# Patient Record
Sex: FEMALE | Race: ASIAN | NOT HISPANIC OR LATINO
[De-identification: names, ages, dates, MRNs, and addresses within clinical notes are randomized per-mention and may not be internally consistent; named-entity substitution may affect disease eponyms.]

---

## 2020-10-26 PROBLEM — Z00.00 ENCOUNTER FOR PREVENTIVE HEALTH EXAMINATION: Status: ACTIVE | Noted: 2020-10-26

## 2020-10-28 ENCOUNTER — APPOINTMENT (OUTPATIENT)
Dept: CARDIOLOGY | Facility: CLINIC | Age: 75
End: 2020-10-28
Payer: MEDICARE

## 2020-10-28 ENCOUNTER — NON-APPOINTMENT (OUTPATIENT)
Age: 75
End: 2020-10-28

## 2020-10-28 VITALS
TEMPERATURE: 98 F | HEART RATE: 63 BPM | DIASTOLIC BLOOD PRESSURE: 83 MMHG | OXYGEN SATURATION: 98 % | SYSTOLIC BLOOD PRESSURE: 157 MMHG | BODY MASS INDEX: 19.84 KG/M2 | HEIGHT: 63 IN | RESPIRATION RATE: 18 BRPM | WEIGHT: 112 LBS

## 2020-10-28 DIAGNOSIS — Z82.49 FAMILY HISTORY OF ISCHEMIC HEART DISEASE AND OTHER DISEASES OF THE CIRCULATORY SYSTEM: ICD-10-CM

## 2020-10-28 DIAGNOSIS — E11.9 TYPE 2 DIABETES MELLITUS W/OUT COMPLICATIONS: ICD-10-CM

## 2020-10-28 PROCEDURE — 93306 TTE W/DOPPLER COMPLETE: CPT

## 2020-10-28 PROCEDURE — 99072 ADDL SUPL MATRL&STAF TM PHE: CPT

## 2020-10-28 PROCEDURE — ZZZZZ: CPT

## 2020-10-28 PROCEDURE — 99204 OFFICE O/P NEW MOD 45 MIN: CPT | Mod: 25

## 2020-10-28 PROCEDURE — 93015 CV STRESS TEST SUPVJ I&R: CPT

## 2020-10-28 PROCEDURE — 93000 ELECTROCARDIOGRAM COMPLETE: CPT | Mod: 59

## 2020-10-29 PROBLEM — Z82.49 FAMILY HISTORY OF MYOCARDIAL INFARCTION: Status: ACTIVE | Noted: 2020-10-29

## 2020-11-03 ENCOUNTER — NON-APPOINTMENT (OUTPATIENT)
Age: 75
End: 2020-11-03

## 2020-11-03 DIAGNOSIS — R93.89 ABNORMAL FINDINGS ON DIAGNOSTIC IMAGING OF OTHER SPECIFIED BODY STRUCTURES: ICD-10-CM

## 2020-11-11 ENCOUNTER — NON-APPOINTMENT (OUTPATIENT)
Age: 75
End: 2020-11-11

## 2020-11-12 ENCOUNTER — APPOINTMENT (OUTPATIENT)
Dept: THORACIC SURGERY | Facility: CLINIC | Age: 75
End: 2020-11-12
Payer: MEDICARE

## 2020-11-12 VITALS
DIASTOLIC BLOOD PRESSURE: 77 MMHG | SYSTOLIC BLOOD PRESSURE: 139 MMHG | OXYGEN SATURATION: 99 % | BODY MASS INDEX: 19.51 KG/M2 | HEART RATE: 61 BPM | WEIGHT: 106 LBS | HEIGHT: 62 IN

## 2020-11-12 DIAGNOSIS — Z87.898 PERSONAL HISTORY OF OTHER SPECIFIED CONDITIONS: ICD-10-CM

## 2020-11-12 PROCEDURE — 99205 OFFICE O/P NEW HI 60 MIN: CPT

## 2020-11-12 PROCEDURE — 99072 ADDL SUPL MATRL&STAF TM PHE: CPT

## 2020-11-13 PROBLEM — Z87.898 HISTORY OF BRADYCARDIA: Status: RESOLVED | Noted: 2020-11-13 | Resolved: 2020-11-13

## 2020-11-13 PROBLEM — E11.9 TYPE 2 DIABETES MELLITUS: Status: ACTIVE | Noted: 2020-11-13

## 2020-11-13 RX ORDER — CHLORHEXIDINE GLUCONATE 4 %
1000 LIQUID (ML) TOPICAL
Refills: 0 | Status: ACTIVE | COMMUNITY

## 2020-11-13 RX ORDER — CHOLECALCIFEROL (VITAMIN D3) 1250 MCG
1.25 MG CAPSULE ORAL
Refills: 0 | Status: ACTIVE | COMMUNITY

## 2020-11-13 RX ORDER — SITAGLIPTIN AND METFORMIN HYDROCHLORIDE 50; 500 MG/1; MG/1
50-500 TABLET, FILM COATED ORAL
Refills: 0 | Status: ACTIVE | COMMUNITY

## 2020-11-13 NOTE — PHYSICAL EXAM
[General Appearance - Well Developed] : well developed [Normal Appearance] : normal appearance [Well Groomed] : well groomed [General Appearance - Well Nourished] : well nourished [No Deformities] : no deformities [General Appearance - In No Acute Distress] : no acute distress [Normal Conjunctiva] : the conjunctiva exhibited no abnormalities [Eyelids - No Xanthelasma] : the eyelids demonstrated no xanthelasmas [Normal Oral Mucosa] : normal oral mucosa [No Oral Pallor] : no oral pallor [No Oral Cyanosis] : no oral cyanosis [Normal Jugular Venous A Waves Present] : normal jugular venous A waves present [Normal Jugular Venous V Waves Present] : normal jugular venous V waves present [No Jugular Venous Hitchcock A Waves] : no jugular venous hitchcock A waves [Heart Rate And Rhythm] : heart rate and rhythm were normal [Heart Sounds] : normal S1 and S2 [Respiration, Rhythm And Depth] : normal respiratory rhythm and effort [Exaggerated Use Of Accessory Muscles For Inspiration] : no accessory muscle use [Auscultation Breath Sounds / Voice Sounds] : lungs were clear to auscultation bilaterally [Abdomen Soft] : soft [Abdomen Tenderness] : non-tender [Abdomen Mass (___ Cm)] : no abdominal mass palpated [Abnormal Walk] : normal gait [Gait - Sufficient For Exercise Testing] : the gait was sufficient for exercise testing [Nail Clubbing] : no clubbing of the fingernails [Cyanosis, Localized] : no localized cyanosis [Petechial Hemorrhages (___cm)] : no petechial hemorrhages [] : no ischemic changes [Oriented To Time, Place, And Person] : oriented to person, place, and time [Affect] : the affect was normal [Mood] : the mood was normal [No Anxiety] : not feeling anxious [FreeTextEntry1] : 2/6 BONNIE diffusely; Trace LE edema noted.

## 2020-11-13 NOTE — HISTORY OF PRESENT ILLNESS
[FreeTextEntry1] : 75 year-old female with HTN, DM with renal cyst and h/o proteinuria presents for evaluation of SOB. Patient reports that for the past 2 weeks she feels SOB with exertion and at rest.  Patient reports h/o bradycardia. Patient had CXR earlier this year.  Patient denies CP.  Patient denies palpitations.  Patient denies h/o syncope.  Patient reports hand numbness that went away after taking vitamin B12.  On examination, patient has 2/6 BONNIE diffusely and trace edema.  I advised patient to undergo an echocardiogram and a treadmill stress test.

## 2020-11-13 NOTE — DISCUSSION/SUMMARY
[FreeTextEntry1] : 75 year-old female with HTN, DM with renal cyst and h/o proteinuria presents for evaluation of SOB. Patient reports that for the past 2 weeks she feels SOB with exertion and at rest.  Patient reports h/o bradycardia. Patient had CXR earlier this year.  Patient denies CP.  Patient denies palpitations.  Patient denies h/o syncope.  Patient reports hand numbness that went away after taking vitamin B12.  On examination, patient has 2/6 BONNIE diffusely and trace edema.  I advised patient to undergo an echocardiogram and a treadmill stress test. \par \par (1) Shortness of breath - Patient underwent an echocardiogram and it showed normal LV function without significant valvular pathology.  Patient underwent a treadmill stress test and completed 4.5 minutes of Kenneth protocol.  There was no ECG evidence of ischemia.  Following treadmill stress, there was no echocardiographic evidence of ischemia.  Patient underwent a CXR and it showed possible right lung mass, which was confirmed on chest CT.  I advised patient to see thoracic surgeon Dr. Matt Richard.  Patient is cleared for surgery and anesthesia if surgery is needed.\par  \par (2) Followup - as needed.

## 2020-11-16 ENCOUNTER — OUTPATIENT (OUTPATIENT)
Dept: OUTPATIENT SERVICES | Facility: HOSPITAL | Age: 75
LOS: 1 days | End: 2020-11-16

## 2020-11-16 ENCOUNTER — APPOINTMENT (OUTPATIENT)
Dept: DISASTER EMERGENCY | Facility: CLINIC | Age: 75
End: 2020-11-16

## 2020-11-16 VITALS
DIASTOLIC BLOOD PRESSURE: 70 MMHG | RESPIRATION RATE: 16 BRPM | HEIGHT: 62 IN | HEART RATE: 61 BPM | TEMPERATURE: 98 F | WEIGHT: 110.01 LBS | OXYGEN SATURATION: 98 % | SYSTOLIC BLOOD PRESSURE: 130 MMHG

## 2020-11-16 DIAGNOSIS — Z01.818 ENCOUNTER FOR OTHER PREPROCEDURAL EXAMINATION: ICD-10-CM

## 2020-11-16 DIAGNOSIS — R59.1 GENERALIZED ENLARGED LYMPH NODES: ICD-10-CM

## 2020-11-16 DIAGNOSIS — E11.9 TYPE 2 DIABETES MELLITUS WITHOUT COMPLICATIONS: ICD-10-CM

## 2020-11-16 DIAGNOSIS — I10 ESSENTIAL (PRIMARY) HYPERTENSION: ICD-10-CM

## 2020-11-16 LAB — HBA1C BLD-MCNC: 5.4 % — SIGNIFICANT CHANGE UP (ref 4–5.6)

## 2020-11-16 RX ORDER — SODIUM CHLORIDE 9 MG/ML
1000 INJECTION, SOLUTION INTRAVENOUS
Refills: 0 | Status: DISCONTINUED | OUTPATIENT
Start: 2020-11-18 | End: 2020-12-02

## 2020-11-16 NOTE — H&P PST ADULT - HISTORY OF PRESENT ILLNESS
74 yo female with preop dx. generalized enlarged lymph nodes presents to pre surgical testing.  Pt with c/o VAZ x 1 month, cardiac workup negative, CXR revealing right lung mass, sp CT Chest revealing lymphadenopathy.  Pt is scheduled for flexible bronchoscopy, endobronchial ultrasound biopsy with cytology.

## 2020-11-16 NOTE — DATA REVIEWED
[FreeTextEntry1] : CT Chest w/ contrast on 11/10/2020:\par - 3.3 x 3 x 4.6cm Rt infrahilar mass, inseparable from the mediastinum, w/ multiple adjacent nodular linear opacities extending to the pleura\par - adjacent consolidation w/ air bronchograms\par - nodular thickening noted along the Rt major and minor fissures\par - a 1 x 0.5cm partially calcified RLL nodule (3:71)\par - focal bronchiectasis w/ mucous plugging and scarring w/in the lingula and bibasilar linear scarring\par - enlarged 1.2cm Rt peribronchial LN (2:45)\par - additional subcentimeter mediastinal LNs and hilar LNs

## 2020-11-16 NOTE — H&P PST ADULT - NEGATIVE NEUROLOGICAL SYMPTOMS
no weakness/no generalized seizures/no paresthesias/no headache/no vertigo/no transient paralysis/no loss of sensation/no difficulty walking

## 2020-11-16 NOTE — H&P PST ADULT - MUSCULOSKELETAL
details… detailed exam no calf tenderness/no joint erythema/no joint warmth/ROM intact/normal strength/no joint swelling

## 2020-11-16 NOTE — H&P PST ADULT - NEGATIVE ENMT SYMPTOMS
no tinnitus/no hearing difficulty/no nasal congestion/no dysphagia/no ear pain/no vertigo/no sinus symptoms

## 2020-11-16 NOTE — HISTORY OF PRESENT ILLNESS
[FreeTextEntry1] : Ms. SHAZIA CHAPMAN, 75 year old female, never smoker, w/ hx of HTN, HLD, DM2, bradycardia, who presented to cardiologist Dr. Jennifer Moser c/o SOB on exertion. Cardiac work-up all negative, then sent for CXR.\par \par CT Chest w/ contrast on 11/10/2020:\par - 3.3 x 3 x 4.6cm Rt infrahilar mass, inseparable from the mediastinum, w/ multiple adjacent nodular linear opacities extending to the pleura\par - adjacent consolidation w/ air bronchograms\par - nodular thickening noted along the Rt major and minor fissures\par - a 1 x 0.5cm partially calcified RLL nodule (3:71)\par - focal bronchiectasis w/ mucous plugging and scarring w/in the lingula and bibasilar linear scarring\par - enlarged 1.2cm Rt peribronchial LN (2:45)\par - additional subcentimeter mediastinal LNs and hilar LNs\par \par Patient is here today for CT Sx consultation, referred by Dr. Moser. Admits to SOB on exertion.\par

## 2020-11-16 NOTE — H&P PST ADULT - NSICDXPASTMEDICALHX_GEN_ALL_CORE_FT
PAST MEDICAL HISTORY:  DM2 (diabetes mellitus, type 2)     Generalized enlarged lymph nodes     Hypertension

## 2020-11-16 NOTE — H&P PST ADULT - NSICDXPROBLEM_GEN_ALL_CORE_FT
PROBLEM DIAGNOSES  Problem: Generalized enlarged lymph nodes  Assessment and Plan:  Pt is scheduled for flexible bronchoscopy, endobronchial ultrasound biopsy with cytology on 11/18/20.  Verbal and written pre op instructions reviewed with patient and pt able to verbalize understanding.  method. COVID testing scheduled preop per pt.   Pt to obtain medical eval as requested by surgeon, document in chart.  ECHO, stress and EKG in chart.      Problem: HTN (hypertension)  Assessment and Plan: Pt instructed to take nifedipine and losartan AM of surgery with a sip of water, pt able to verbalize understanding.     Problem: DM (diabetes mellitus)  Assessment and Plan: OR booking notified of pt's DM status via fax.   Pt instructed to hold Janumet 11/17 PM and 11/18, pt able to verbalize understanding.

## 2020-11-16 NOTE — CONSULT LETTER
[Consult Letter:] : I had the pleasure of evaluating your patient, [unfilled]. [( Thank you for referring [unfilled] for consultation for _____ )] : Thank you for referring [unfilled] for consultation for [unfilled] [Please see my note below.] : Please see my note below. [Consult Closing:] : Thank you very much for allowing me to participate in the care of this patient.  If you have any questions, please do not hesitate to contact me. [Sincerely,] : Sincerely, [DrPraneeth  ___] : Dr. PURI [FreeTextEntry2] : Jennifer Moser MD (Cardiologist/Referring)\par Dr. Maria Luisa Harkins (PCP) [FreeTextEntry3] : Matt Richard MD, MPH \par System Director of Thoracic Surgery \par Director of Comprehensive Lung and Foregut [place] \par Professor Cardiovascular & Thoracic Surgery  \par [facility] [employer] [facility]\par \par [facility]\par [address]\par Oncology [address]\[place] [address]\par Tel: [phone]\par Fax: [phone]\par

## 2020-11-16 NOTE — ASSESSMENT
[FreeTextEntry1] : Ms. SHAZIA CHAPMAN, 75 year old female, never smoker, w/ hx of HTN, HLD, DM2, bradycardia, who presented to cardiologist Dr. Jennifer Moser c/o SOB on exertion. Cardiac work-up all negative, then sent for CXR.\par \par CT Chest w/ contrast on 11/10/2020:\par - 3.3 x 3 x 4.6cm Rt infrahilar mass, inseparable from the mediastinum, w/ multiple adjacent nodular linear opacities extending to the pleura\par - adjacent consolidation w/ air bronchograms\par - nodular thickening noted along the Rt major and minor fissures\par - a 1 x 0.5cm partially calcified RLL nodule (3:71)\par - focal bronchiectasis w/ mucous plugging and scarring w/in the lingula and bibasilar linear scarring\par - enlarged 1.2cm Rt peribronchial LN (2:45)\par - additional subcentimeter mediastinal LNs and hilar LNs\par \par I have reviewed the patient's medical records and diagnostic images at time of this office consultation and have made the following recommendation:\par 1. CT scan reviewed, I recommended a Flex Bronch EBUS Bx on 11/18/2020. Risks and benefits and alternatives explained to patient, all questions answered, patient agreed to proceed with procedure.\par 2. PENDING PFTs and PET/CT scan\par 3. Medical clearance, PST, and COVID testing prior to bronch\par \par \par I personally performed the services described in the documentation, reviewed the documentation recorded by the scribe in my presence and it accurately and completely records my words and actions.\par \par I, Katey Marcial, ALEX, am scribing for and the presence of TIARA Flores, the following sections HISTORY OF PRESENT ILLNESS, PAST MEDICAL/FAMILY/SOCIAL HISTORY; REVIEW OF SYSTEMS; VITAL SIGNS; PHYSICAL EXAM; DISPOSITION.\par \par

## 2020-11-18 ENCOUNTER — RESULT REVIEW (OUTPATIENT)
Age: 75
End: 2020-11-18

## 2020-11-18 ENCOUNTER — APPOINTMENT (OUTPATIENT)
Dept: THORACIC SURGERY | Facility: HOSPITAL | Age: 75
End: 2020-11-18

## 2020-11-18 ENCOUNTER — OUTPATIENT (OUTPATIENT)
Dept: OUTPATIENT SERVICES | Facility: HOSPITAL | Age: 75
LOS: 1 days | Discharge: HOME | End: 2020-11-18
Payer: MEDICARE

## 2020-11-18 VITALS
WEIGHT: 110.01 LBS | HEIGHT: 62 IN | TEMPERATURE: 98 F | DIASTOLIC BLOOD PRESSURE: 65 MMHG | SYSTOLIC BLOOD PRESSURE: 142 MMHG | OXYGEN SATURATION: 100 % | RESPIRATION RATE: 14 BRPM | HEART RATE: 61 BPM

## 2020-11-18 VITALS — RESPIRATION RATE: 16 BRPM | OXYGEN SATURATION: 98 % | HEART RATE: 79 BPM

## 2020-11-18 DIAGNOSIS — R59.1 GENERALIZED ENLARGED LYMPH NODES: ICD-10-CM

## 2020-11-18 LAB — SARS-COV-2 N GENE NPH QL NAA+PROBE: NOT DETECTED

## 2020-11-18 PROCEDURE — 88305 TISSUE EXAM BY PATHOLOGIST: CPT | Mod: 26

## 2020-11-18 PROCEDURE — 31629 BRONCHOSCOPY/NEEDLE BX EACH: CPT

## 2020-11-18 PROCEDURE — 31624 DX BRONCHOSCOPE/LAVAGE: CPT

## 2020-11-18 PROCEDURE — 31625 BRONCHOSCOPY W/BIOPSY(S): CPT | Mod: 59

## 2020-11-18 PROCEDURE — 88112 CYTOPATH CELL ENHANCE TECH: CPT | Mod: 26,59

## 2020-11-18 PROCEDURE — 31623 DX BRONCHOSCOPE/BRUSH: CPT

## 2020-11-18 PROCEDURE — 88173 CYTOPATH EVAL FNA REPORT: CPT | Mod: 26

## 2020-11-18 PROCEDURE — 31652 BRONCH EBUS SAMPLNG 1/2 NODE: CPT

## 2020-11-18 PROCEDURE — 71045 X-RAY EXAM CHEST 1 VIEW: CPT | Mod: 26

## 2020-11-18 PROCEDURE — 31633 BRONCHOSCOPY/NEEDLE BX ADDL: CPT

## 2020-11-18 PROCEDURE — 31645 BRNCHSC W/THER ASPIR 1ST: CPT

## 2020-11-18 RX ORDER — PREGABALIN 225 MG/1
1 CAPSULE ORAL
Qty: 0 | Refills: 0 | DISCHARGE

## 2020-11-18 RX ORDER — SITAGLIPTIN AND METFORMIN HYDROCHLORIDE 500; 50 MG/1; MG/1
1 TABLET, FILM COATED ORAL
Qty: 0 | Refills: 0 | DISCHARGE

## 2020-11-18 RX ORDER — CALCIUM POLYCARBOPHIL 625 MG/1
2 TABLET, FILM COATED ORAL
Qty: 0 | Refills: 0 | DISCHARGE

## 2020-11-18 RX ORDER — NIFEDIPINE 30 MG
1 TABLET, EXTENDED RELEASE 24 HR ORAL
Qty: 0 | Refills: 0 | DISCHARGE

## 2020-11-18 RX ORDER — LOSARTAN POTASSIUM 100 MG/1
1 TABLET, FILM COATED ORAL
Qty: 0 | Refills: 0 | DISCHARGE

## 2020-11-18 NOTE — ASU DISCHARGE PLAN (ADULT/PEDIATRIC) - NURSING INSTRUCTIONS
Cool and warm liquids that are not irritating to the throat should be given initially. Avoid hot liquids and  Avoid citrus juices and milk if they are irritating to your throat.  Advance at your own pace starting with soft foods and advancing to a regular diet. Avoid rough and scratchy foods and foods that are difficult to chew until you are comfortable.

## 2020-11-18 NOTE — ASU DISCHARGE PLAN (ADULT/PEDIATRIC) - CARE PROVIDER_API CALL
Matt Richard  SURGERY  [address] [place] [address] [place] [address]  Phone: [phone]  Fax: [phone]  Follow Up Time:

## 2020-11-18 NOTE — BRIEF OPERATIVE NOTE - NSICDXBRIEFPROCEDURE_GEN_ALL_CORE_FT
PROCEDURES:  Bronchoscopy, with BAL 18-Nov-2020 18:30:53 endobronchial biopsy and endobronchial ultrasound Lidya Beltran

## 2020-11-18 NOTE — ASU DISCHARGE PLAN (ADULT/PEDIATRIC) - CALL YOUR DOCTOR IF YOU HAVE ANY OF THE FOLLOWING:
shortness of breath, it is normal to cough up small amounts of blood tinged sputum/Bleeding that does not stop/Fever greater than (need to indicate Fahrenheit or Celsius) Bleeding that does not stop/Nausea and vomiting that does not stop/Fever greater than (need to indicate Fahrenheit or Celsius)/shortness of breath, it is normal to cough up small amounts of blood tinged sputum/Inability to tolerate liquids or foods/Pain not relieved by Medications/Unable to urinate

## 2020-11-20 ENCOUNTER — TRANSCRIPTION ENCOUNTER (OUTPATIENT)
Age: 75
End: 2020-11-20

## 2020-11-20 LAB
NON-GYNECOLOGICAL CYTOLOGY STUDY: SIGNIFICANT CHANGE UP

## 2020-11-23 LAB — TM INTERPRETATION: SIGNIFICANT CHANGE UP

## 2020-11-24 LAB — GLUCOSE BLDC GLUCOMTR-MCNC: 102 MG/DL — HIGH (ref 70–99)

## 2020-11-27 PROBLEM — R59.1 GENERALIZED ENLARGED LYMPH NODES: Chronic | Status: ACTIVE | Noted: 2020-11-16

## 2020-11-27 PROBLEM — I10 ESSENTIAL (PRIMARY) HYPERTENSION: Chronic | Status: ACTIVE | Noted: 2020-11-16

## 2020-11-27 PROBLEM — E11.9 TYPE 2 DIABETES MELLITUS WITHOUT COMPLICATIONS: Chronic | Status: ACTIVE | Noted: 2020-11-16

## 2020-12-03 ENCOUNTER — APPOINTMENT (OUTPATIENT)
Dept: THORACIC SURGERY | Facility: CLINIC | Age: 75
End: 2020-12-03
Payer: MEDICARE

## 2020-12-03 VITALS
HEIGHT: 62.99 IN | RESPIRATION RATE: 17 BRPM | DIASTOLIC BLOOD PRESSURE: 81 MMHG | SYSTOLIC BLOOD PRESSURE: 148 MMHG | HEART RATE: 63 BPM | WEIGHT: 106 LBS | BODY MASS INDEX: 18.78 KG/M2 | OXYGEN SATURATION: 99 %

## 2020-12-03 DIAGNOSIS — R91.1 SOLITARY PULMONARY NODULE: ICD-10-CM

## 2020-12-03 PROCEDURE — 99072 ADDL SUPL MATRL&STAF TM PHE: CPT

## 2020-12-03 PROCEDURE — 99215 OFFICE O/P EST HI 40 MIN: CPT

## 2020-12-04 PROBLEM — R91.1 LUNG NODULE: Status: ACTIVE | Noted: 2020-12-04

## 2020-12-04 RX ORDER — ICOSAPENT ETHYL 1000 MG/1
1 CAPSULE ORAL
Qty: 360 | Refills: 0 | Status: COMPLETED | COMMUNITY
Start: 2020-09-27

## 2020-12-04 RX ORDER — DENOSUMAB 60 MG/ML
60 INJECTION SUBCUTANEOUS
Qty: 1 | Refills: 0 | Status: COMPLETED | COMMUNITY
Start: 2020-09-01

## 2020-12-04 RX ORDER — HYDROCORTISONE 1 %
12 CREAM (GRAM) TOPICAL
Qty: 400 | Refills: 0 | Status: COMPLETED | COMMUNITY
Start: 2020-09-27

## 2020-12-04 RX ORDER — NIFEDIPINE 90 MG/1
90 TABLET, EXTENDED RELEASE ORAL
Qty: 90 | Refills: 0 | Status: COMPLETED | COMMUNITY
Start: 2020-09-27

## 2020-12-04 RX ORDER — ACARBOSE 25 MG/1
25 TABLET ORAL
Qty: 270 | Refills: 0 | Status: ACTIVE | COMMUNITY
Start: 2020-09-27

## 2020-12-04 RX ORDER — PEN NEEDLE, DIABETIC 32GX 5/32"
NEEDLE, DISPOSABLE MISCELLANEOUS
Qty: 100 | Refills: 0 | Status: COMPLETED | COMMUNITY
Start: 2020-09-01

## 2020-12-04 RX ORDER — LANCETS 30 GAUGE
EACH MISCELLANEOUS
Qty: 100 | Refills: 0 | Status: COMPLETED | COMMUNITY
Start: 2020-09-01

## 2020-12-04 RX ORDER — BLOOD SUGAR DIAGNOSTIC
STRIP MISCELLANEOUS
Qty: 100 | Refills: 0 | Status: COMPLETED | COMMUNITY
Start: 2020-09-01

## 2020-12-04 RX ORDER — ALENDRONATE SODIUM 70 MG/1
70 TABLET ORAL
Qty: 12 | Refills: 0 | Status: COMPLETED | COMMUNITY
Start: 2020-09-27

## 2020-12-04 RX ORDER — ATORVASTATIN CALCIUM 10 MG/1
10 TABLET, FILM COATED ORAL
Qty: 30 | Refills: 0 | Status: ACTIVE | COMMUNITY
Start: 2020-11-07

## 2020-12-04 RX ORDER — MUPIROCIN 20 MG/G
2 OINTMENT TOPICAL
Qty: 22 | Refills: 0 | Status: COMPLETED | COMMUNITY
Start: 2020-11-13

## 2020-12-04 RX ORDER — ALBUTEROL SULFATE 90 UG/1
108 (90 BASE) INHALANT RESPIRATORY (INHALATION)
Qty: 18 | Refills: 0 | Status: ACTIVE | COMMUNITY
Start: 2020-11-14

## 2020-12-04 RX ORDER — FLUTICASONE FUROATE, UMECLIDINIUM BROMIDE AND VILANTEROL TRIFENATATE 100; 62.5; 25 UG/1; UG/1; UG/1
100-62.5-25 POWDER RESPIRATORY (INHALATION)
Qty: 60 | Refills: 0 | Status: ACTIVE | COMMUNITY
Start: 2020-11-15

## 2020-12-07 ENCOUNTER — APPOINTMENT (OUTPATIENT)
Dept: NUCLEAR MEDICINE | Facility: HOSPITAL | Age: 75
End: 2020-12-07

## 2020-12-07 NOTE — ASSESSMENT
[FreeTextEntry1] : Ms. SHAZIA CHAPMAN, 75 year old female, never smoker, w/ hx of HTN, HLD, DM2, bradycardia, who presented to cardiologist Dr. Jennifer Moser c/o SOB on exertion. Cardiac work-up all negative, then sent for CXR.\par \par CT Chest w/ contrast on 11/10/2020:\par - 3.3 x 3 x 4.6cm Rt infrahilar mass, inseparable from the mediastinum, w/ multiple adjacent nodular linear opacities extending to the pleura\par - adjacent consolidation w/ air bronchograms\par - nodular thickening noted along the Rt major and minor fissures\par - a 1 x 0.5cm partially calcified RLL nodule (3:71)\par - focal bronchiectasis w/ mucous plugging and scarring w/in the lingula and bibasilar linear scarring\par - enlarged 1.2cm Rt peribronchial LN (2:45)\par - additional subcentimeter mediastinal LNs and hilar LNs\par \par PFTs on 11/13/2020: %, FEV1 51%, DLCO 69%\par \par PET/CT on 11/15/2020:\par - 2.5 x 2.9 cm spiculated right infrahilar mass in the RLL with SUV 11.2. The lesion extends to the right hilum as well as the pleural surface, with some subtle pleural nodularity present, attributed to neoplastic involvement. \par - a 1.3 x 1.6 cm precarinal LN with SUV 11.8 (image 54). The bilateral hilar LNs demonstrate lesser degree of activity and are not enlarged. \par - there additional nodules along the right major and minor interlobar fissures, up to 5 mm (image 57)\par \par Patient is 2 weeks s/p FB with bx of RLL superior segmental lung mass, FB with brushing of RLL superior segmental bronchus, FB with BAL of RLL superior segmental bronchus, EBUS, FNA of right level IV lymph node, FNA of left hilar LNs, FB aspiration of secretions on 11/18/2020. Path of RLL bronchial brush revealed atypical findings. Path of RLL superior segment bx revealed negative for malignant cells. Path of R4 LN revealed negative for malignant cells. Consistent with an anthracotic LN. Path of left hilar revealed non diagnostic. Path of RLL superior segment BAL revealed atypical findings. \par \par I have reviewed the patient's medical records and diagnostic images at time of this office consultation and have made the following recommendation:\par 1. Path reviewed and explained to patient and her family member, I recommended a Flexible Bronchoscopy, Mediastinoscopy, Right VATS, Robotic-assisted, RLLobectomy, possible bilobectomy, MLND on 12/14/2020. Risks and benefits and alternatives explained to patient, all questions answered, patient agreed to proceed with surgery.\par 2. VQ Lung scan\par 3. MRI of Brain w/w/o contrast\par 4. Cardiac clearance and PST. \par \par I personally performed the services described in the documentation, reviewed the documentation recorded by the scribe in my presence and it accurately and completely records my words and actions.\par \par I, Yanely Gonzalez NP, am scribing for and the presence of TIARA Flores, the following sections HISTORY OF PRESENT ILLNESS, PAST MEDICAL/FAMILY/SOCIAL HISTORY; REVIEW OF SYSTEMS; VITAL SIGNS; PHYSICAL EXAM; DISPOSITION.

## 2020-12-07 NOTE — DATA REVIEWED
[FreeTextEntry1] : Patient is 2 weeks s/p FB with bx of RLL superior segmental lung mass, FB with brushing of RLL superior segmental bronchus, FB with BAL of RLL superior segmental bronchus, EBUS, FNA of right level IV lymph node, FNA of left hilar LNs, FB aspiration of secretions on 11/18/2020. Path of RLL bronchial brush revealed atypical findings. Path of RLL superior segment bx revealed negative for malignant cells. Path of R4 LN revealed negative for malignant cells. Consistent with an anthracotic LN. Path of left hilar revealed non diagnostic. Path of RLL superior segment BAL revealed atypical findings.

## 2020-12-07 NOTE — HISTORY OF PRESENT ILLNESS
[FreeTextEntry1] : Ms. SHAZIA CHAPMAN, 75 year old female, never smoker, w/ hx of HTN, HLD, DM2, bradycardia, who presented to cardiologist Dr. Jennifer Moser c/o SOB on exertion. Cardiac work-up all negative, then sent for CXR.\par \par CT Chest w/ contrast on 11/10/2020:\par - 3.3 x 3 x 4.6cm Rt infrahilar mass, inseparable from the mediastinum, w/ multiple adjacent nodular linear opacities extending to the pleura\par - adjacent consolidation w/ air bronchograms\par - nodular thickening noted along the Rt major and minor fissures\par - a 1 x 0.5cm partially calcified RLL nodule (3:71)\par - focal bronchiectasis w/ mucous plugging and scarring w/in the lingula and bibasilar linear scarring\par - enlarged 1.2cm Rt peribronchial LN (2:45)\par - additional subcentimeter mediastinal LNs and hilar LNs\par \par PFTs on 11/13/2020: %, FEV1 51%, DLCO 69%\par \par PET/CT on 11/15/2020:\par - 2.5 x 2.9 cm spiculated right infrahilar mass in the RLL with SUV 11.2. The lesion extends to the right hilum as well as the pleural surface, with some subtle pleural nodularity present, attributed to neoplastic involvement. \par - a 1.3 x 1.6 cm precarinal LN with SUV 11.8 (image 54). The bilateral hilar LNs demonstrate lesser degree of activity and are not enlarged. \par - there additional nodules along the right major and minor interlobar fissures, up to 5 mm (image 57)\par \par Patient is 2 weeks s/p FB with bx of RLL superior segmental lung mass, FB with brushing of RLL superior segmental bronchus, FB with BAL of RLL superior segmental bronchus, EBUS, FNA of right level IV lymph node, FNA of left hilar LNs, FB aspiration of secretions on 11/18/2020. Path of RLL bronchial brush revealed atypical findings. Path of RLL superior segment bx revealed negative for malignant cells. Path of R4 LN revealed negative for malignant cells. Consistent with an anthracotic LN. Path of left hilar revealed non diagnostic. Path of RLL superior segment BAL revealed atypical findings. \par \par Patient is here today for a follow up. Patient c/o SOB on exertion, denies cough, chest pain, fever, chills, loss of appetite, weight loss, or hemoptysis.

## 2020-12-07 NOTE — CONSULT LETTER
[Dear  ___] : Dear  [unfilled], [Consult Letter:] : I had the pleasure of evaluating your patient, [unfilled]. [( Thank you for referring [unfilled] for consultation for _____ )] : Thank you for referring [unfilled] for consultation for [unfilled] [Please see my note below.] : Please see my note below. [Consult Closing:] : Thank you very much for allowing me to participate in the care of this patient.  If you have any questions, please do not hesitate to contact me. [Sincerely,] : Sincerely, [DrPraneeth  ___] : Dr. PURI [FreeTextEntry2] : Jennifer Moser MD (Cardiologist/Referring)\par Dr. Maria Luisa Harkins (PCP)  [FreeTextEntry3] : Matt Richard MD, MPH \par System Director of Thoracic Surgery \par Director of Comprehensive Lung and Foregut [place] \par Professor Cardiovascular & Thoracic Surgery \par [facility] [employer] [facility]\par \par

## 2020-12-11 ENCOUNTER — APPOINTMENT (OUTPATIENT)
Dept: DISASTER EMERGENCY | Facility: CLINIC | Age: 75
End: 2020-12-11

## 2020-12-14 ENCOUNTER — APPOINTMENT (OUTPATIENT)
Dept: THORACIC SURGERY | Facility: HOSPITAL | Age: 75
End: 2020-12-14

## 2021-03-01 ENCOUNTER — APPOINTMENT (OUTPATIENT)
Dept: CARDIOLOGY | Facility: CLINIC | Age: 76
End: 2021-03-01
Payer: MEDICARE

## 2021-03-03 ENCOUNTER — NON-APPOINTMENT (OUTPATIENT)
Age: 76
End: 2021-03-03

## 2021-03-03 ENCOUNTER — APPOINTMENT (OUTPATIENT)
Dept: CARDIOLOGY | Facility: CLINIC | Age: 76
End: 2021-03-03
Payer: MEDICARE

## 2021-03-03 VITALS
BODY MASS INDEX: 19.49 KG/M2 | OXYGEN SATURATION: 99 % | SYSTOLIC BLOOD PRESSURE: 156 MMHG | RESPIRATION RATE: 18 BRPM | DIASTOLIC BLOOD PRESSURE: 76 MMHG | HEART RATE: 72 BPM | WEIGHT: 110 LBS | TEMPERATURE: 98.2 F

## 2021-03-03 DIAGNOSIS — R91.8 OTHER NONSPECIFIC ABNORMAL FINDING OF LUNG FIELD: ICD-10-CM

## 2021-03-03 DIAGNOSIS — Z01.810 ENCOUNTER FOR PREPROCEDURAL CARDIOVASCULAR EXAMINATION: ICD-10-CM

## 2021-03-03 DIAGNOSIS — R06.02 SHORTNESS OF BREATH: ICD-10-CM

## 2021-03-03 PROCEDURE — 99213 OFFICE O/P EST LOW 20 MIN: CPT

## 2021-03-03 PROCEDURE — 93000 ELECTROCARDIOGRAM COMPLETE: CPT | Mod: NC

## 2021-03-03 NOTE — HISTORY OF PRESENT ILLNESS
[FreeTextEntry1] : 76 year-old female with HTN, DM with renal cyst and h/o proteinuria presents for followup.  \par \par Patient was last seen on 10/28/20 for evaluation of SOB.  Patient underwent an echocardiogram and it showed normal LV function without significant valvular pathology.  Patient underwent a treadmill stress test and completed 4.5 minutes of Kenneth protocol.  There was no ECG evidence of ischemia.  Following treadmill stress, there was no echocardiographic evidence of ischemia.  CXR showed possible right hilar mass.  Chest CT showed 4.6 cm lung mass.  I advised patient to see Dr. Matt Richard.  Patient underwent biopsy which was unrevealing.  Patient was advised to undergo VATS.\par

## 2021-03-03 NOTE — REASON FOR VISIT
[FreeTextEntry1] : 3/3/21 - Patient did not go for surgery and went to [facility] [place] [place] on [address].  She may go for medical therapy and was told that it may affect her heart.  I advised patient that her LV function is normal and may proceed with treatment.\par \par 10/28/20 - Patient reports that for the past 2 weeks she feels SOB with exertion and at rest.  Patient reports h/o bradycardia. Patient had CXR earlier this year.  Patient denies CP.  Patient denies palpitations.  Patient denies h/o syncope.  Patient reports hand numbness that went away after taking vitamin B12.  On examination, patient has 2/6 BONNIE diffusely and trace edema.  I advised patient to undergo an echocardiogram and a treadmill stress test.

## 2021-05-03 ENCOUNTER — APPOINTMENT (OUTPATIENT)
Dept: CARDIOLOGY | Facility: CLINIC | Age: 76
End: 2021-05-03
Payer: MEDICARE

## 2021-05-03 VITALS
RESPIRATION RATE: 18 BRPM | OXYGEN SATURATION: 99 % | DIASTOLIC BLOOD PRESSURE: 69 MMHG | BODY MASS INDEX: 19.14 KG/M2 | WEIGHT: 108 LBS | HEART RATE: 68 BPM | TEMPERATURE: 97.9 F | SYSTOLIC BLOOD PRESSURE: 161 MMHG

## 2021-05-03 PROCEDURE — 99213 OFFICE O/P EST LOW 20 MIN: CPT

## 2021-05-03 PROCEDURE — 93000 ELECTROCARDIOGRAM COMPLETE: CPT

## 2021-05-03 RX ORDER — LOSARTAN POTASSIUM 50 MG/1
50 TABLET, FILM COATED ORAL DAILY
Qty: 30 | Refills: 5 | Status: ACTIVE | COMMUNITY
Start: 1900-01-01 | End: 1900-01-01

## 2021-05-03 RX ORDER — OSIMERTINIB 80 1/1
80 TABLET, FILM COATED ORAL
Qty: 30 | Refills: 0 | Status: ACTIVE | COMMUNITY
Start: 2021-04-29

## 2021-05-03 NOTE — REASON FOR VISIT
[Symptom and Test Evaluation] : symptom and test evaluation [Follow-Up - Clinic] : a clinic follow-up of [Dyspnea] : dyspnea [FreeTextEntry1] : 5/3/21 - Patient has been stable.  Patient denies CP, SOB, palpitations, or lightheadedness.  Patient is to start Tagrisso and was told and there is a small chance of cardiotoxicity.  Her BP was elevated.  I advised patient to increase Losartan to 50 mg.  FU 3 months.  \par \par 3/3/21 - Patient did not go for surgery and went to [facility] [place] on [address].  She may go for medical therapy and was told that it may affect her heart.  I advised patient that her LV function is normal and may proceed with treatment.\par \par 10/28/20 - Patient reports that for the past 2 weeks she feels SOB with exertion and at rest.  Patient reports h/o bradycardia. Patient had CXR earlier this year.  Patient denies CP.  Patient denies palpitations.  Patient denies h/o syncope.  Patient reports hand numbness that went away after taking vitamin B12.  On examination, patient has 2/6 BONNIE diffusely and trace edema.  I advised patient to undergo an echocardiogram and a treadmill stress test.

## 2021-05-03 NOTE — HISTORY OF PRESENT ILLNESS
[FreeTextEntry1] : 76 year-old female with HTN, DM with renal cyst and h/o proteinuria presents for followup.  \par \par Patient was last seen on 3/3/21.  She is on Losartan 25 mg and Nifedipine ER 90 mg for HTN.\par \par Last echo was on 10/28/20 and it showed normal LV function without significant valvular pathology.  \par \par Last stress was on 10/28/20 and completed 4.5 minutes of Kenneth protocol.  There was no ECG evidence of ischemia.  Following treadmill stress, there was no echocardiographic evidence of ischemia.  CXR showed possible right hilar mass.  Chest CT showed 4.6 cm lung mass.  I advised patient to see Dr. Matt Richard.  Patient underwent biopsy which was unrevealing.  Patient was advised to undergo VATS.\par

## 2021-05-03 NOTE — PHYSICAL EXAM
[General Appearance - Well Developed] : well developed [Normal Appearance] : normal appearance [Well Groomed] : well groomed [General Appearance - Well Nourished] : well nourished [No Deformities] : no deformities [General Appearance - In No Acute Distress] : no acute distress [Normal Conjunctiva] : the conjunctiva exhibited no abnormalities [Eyelids - No Xanthelasma] : the eyelids demonstrated no xanthelasmas [Normal Oral Mucosa] : normal oral mucosa [No Oral Pallor] : no oral pallor [No Oral Cyanosis] : no oral cyanosis [Normal Jugular Venous A Waves Present] : normal jugular venous A waves present [Normal Jugular Venous V Waves Present] : normal jugular venous V waves present [No Jugular Venous Hitchcock A Waves] : no jugular venous hitchcock A waves [Respiration, Rhythm And Depth] : normal respiratory rhythm and effort [Exaggerated Use Of Accessory Muscles For Inspiration] : no accessory muscle use [Auscultation Breath Sounds / Voice Sounds] : lungs were clear to auscultation bilaterally [Heart Rate And Rhythm] : heart rate and rhythm were normal [Heart Sounds] : normal S1 and S2 [Abdomen Soft] : soft [Abdomen Tenderness] : non-tender [Abdomen Mass (___ Cm)] : no abdominal mass palpated [Abnormal Walk] : normal gait [Gait - Sufficient For Exercise Testing] : the gait was sufficient for exercise testing [Nail Clubbing] : no clubbing of the fingernails [Cyanosis, Localized] : no localized cyanosis [Petechial Hemorrhages (___cm)] : no petechial hemorrhages [] : no ischemic changes [Oriented To Time, Place, And Person] : oriented to person, place, and time [Affect] : the affect was normal [Mood] : the mood was normal [No Anxiety] : not feeling anxious [FreeTextEntry1] : 2/6 BONNIE diffusely; trace LE edema noted.

## 2022-04-22 ENCOUNTER — APPOINTMENT (OUTPATIENT)
Dept: CARDIOLOGY | Facility: CLINIC | Age: 77
End: 2022-04-22
Payer: MEDICARE

## 2022-04-22 ENCOUNTER — NON-APPOINTMENT (OUTPATIENT)
Age: 77
End: 2022-04-22

## 2022-04-22 VITALS
RESPIRATION RATE: 18 BRPM | HEART RATE: 81 BPM | OXYGEN SATURATION: 100 % | TEMPERATURE: 97.6 F | SYSTOLIC BLOOD PRESSURE: 153 MMHG | DIASTOLIC BLOOD PRESSURE: 70 MMHG | WEIGHT: 97 LBS | BODY MASS INDEX: 17.19 KG/M2

## 2022-04-22 PROCEDURE — 93000 ELECTROCARDIOGRAM COMPLETE: CPT

## 2022-04-22 PROCEDURE — 99214 OFFICE O/P EST MOD 30 MIN: CPT

## 2022-04-22 PROCEDURE — 93306 TTE W/DOPPLER COMPLETE: CPT

## 2022-04-22 RX ORDER — SUCRALFATE 1 G/1
1 TABLET ORAL 3 TIMES DAILY
Qty: 90 | Refills: 0 | Status: ACTIVE | COMMUNITY
Start: 2022-04-22 | End: 1900-01-01

## 2022-04-22 RX ORDER — FAMOTIDINE 20 MG/1
20 TABLET, FILM COATED ORAL
Qty: 30 | Refills: 5 | Status: ACTIVE | COMMUNITY
Start: 2022-04-22 | End: 1900-01-01

## 2022-04-26 NOTE — HISTORY OF PRESENT ILLNESS
[FreeTextEntry1] : 4/22/22 - Patient had cardiac tamponade and underwent pericardiocentesis on 4/4 at [facility], cytology was negative for cancer. Patient was hospitalized for 5 days. Patient had no symptoms at the time.. Patient denies CP, SOB, palpitations, or lightheadedness. Patient reports faster HR than before. Patient has poor appetite and weight loss. I advised patient to see GI.  I advised patient to undergo an echocardiogram to assess pericardial effusion\par \par 5/3/21 - Patient has been stable.  Patient denies CP, SOB, palpitations, or lightheadedness.  Patient is to start Tagrisso and was told and there is a small chance of cardiotoxicity.  Her BP was elevated.  I advised patient to increase Losartan to 50 mg.  FU 3 months.  \par \par 3/3/21 - Patient did not go for surgery and went [place] [facility] [place] [address]  She may go for medical therapy and was told that it may affect her heart.  I advised patient that her LV function is normal and may proceed with treatment.\par \par 10/28/20 - Patient reports that for the past 2 weeks she feels SOB with exertion and at rest.  Patient reports h/o bradycardia. Patient had CXR earlier this year.  Patient denies CP.  Patient denies palpitations.  Patient denies h/o syncope.  Patient reports hand numbness that went away after taking vitamin B12.  On examination, patient has 2/6 BONNIE diffusely and trace edema.  I advised patient to undergo an echocardiogram and a treadmill stress test.

## 2022-04-26 NOTE — REASON FOR VISIT
[FreeTextEntry1] : 77 year-old female with lung CA, HTN, DM with renal cyst and h/o proteinuria presents for followup.  \par \par Patient was last seen on 5/3/21.  Patient was to start Tagrisso and was told and there is a small chance of cardiotoxicity.  Her BP was elevated.  I advised patient to increase Losartan to 50 mg.    \par \par She is on Losartan 50 mg and Nifedipine ER 90 mg for HTN.\par \par Last echo was on 10/28/20 and it showed normal LV function without significant valvular pathology.  \par \par Last stress was on 10/28/20 and completed 4.5 minutes of Kenneth protocol.  There was no ECG evidence of ischemia.  Following treadmill stress, there was no echocardiographic evidence of ischemia.  CXR showed possible right hilar mass.  Chest CT showed 4.6 cm lung mass.  I advised patient to see Dr. Matt Richard.  Patient underwent biopsy which was unrevealing.  Patient was advised to undergo VATS.\par  \par \par

## 2022-05-20 ENCOUNTER — APPOINTMENT (OUTPATIENT)
Dept: CARDIOLOGY | Facility: CLINIC | Age: 77
End: 2022-05-20
Payer: MEDICARE

## 2022-05-20 VITALS
DIASTOLIC BLOOD PRESSURE: 71 MMHG | HEART RATE: 82 BPM | BODY MASS INDEX: 17.01 KG/M2 | WEIGHT: 96 LBS | SYSTOLIC BLOOD PRESSURE: 149 MMHG | RESPIRATION RATE: 18 BRPM | TEMPERATURE: 97.6 F | OXYGEN SATURATION: 99 %

## 2022-05-20 PROCEDURE — 99213 OFFICE O/P EST LOW 20 MIN: CPT

## 2022-05-20 PROCEDURE — 93306 TTE W/DOPPLER COMPLETE: CPT

## 2022-08-31 NOTE — HISTORY OF PRESENT ILLNESS
[FreeTextEntry1] : 5/20/22 - Patient has been stable.  Patient denies CP, SOB, palpitations, or lightheadedness.  Patient denies h/o syncope.  Patient has trace LE edema.  Frequent ectopy noted on exam.  2/6 BONNIE noted.  I advised patient to undergo an echocardiogram to followup on pericardial effusion.  Echo showed small pericardial effusion (0.8 cm posteriorly) without echocardiographic evidence of tamponade.\par \par 4/22/22 - Patient had cardiac tamponade and underwent pericardiocentesis on 4/4 at [facility], cytology was negative for cancer. Patient was hospitalized for 5 days. Patient had no symptoms at the time.. Patient denies CP, SOB, palpitations, or lightheadedness. Patient reports faster HR than before. Patient has poor appetite and weight loss. I advised patient to see GI.  I advised patient to undergo an echocardiogram to assess pericardial effusion.  Echo showed small pericardial effusion (0.8 cm posteriorly) without echocardiographic evidence of tamponade.\par \par 5/3/21 - Patient has been stable.  Patient denies CP, SOB, palpitations, or lightheadedness.  Patient is to start Tagrisso and was told and there is a small chance of cardiotoxicity.  Her BP was elevated.  I advised patient to increase Losartan to 50 mg.  FU 3 months.  \par \par 3/3/21 - Patient did not go for surgery and went to [facility] on [address].  She may go for medical therapy and was told that it may affect her heart.  I advised patient that her LV function is normal and may proceed with treatment.\par \par 10/28/20 - Patient reports that for the past 2 weeks she feels SOB with exertion and at rest.  Patient reports h/o bradycardia. Patient had CXR earlier this year.  Patient denies CP.  Patient denies palpitations.  Patient denies h/o syncope.  Patient reports hand numbness that went away after taking vitamin B12.  On examination, patient has 2/6 BONNIE diffusely and trace edema.  I advised patient to undergo an echocardiogram and a treadmill stress test.

## 2022-08-31 NOTE — REASON FOR VISIT
[FreeTextEntry1] : 77 year-old female with lung CA, pericardial effusion, HTN, DM with renal cyst and h/o proteinuria presents for followup.  \par \par Patient was last seen on 5/20/22 for followup.  Echo showed small pericardial effusion (0.8 cm posteriorly) without echocardiographic evidence of tamponade.\par \par She is on Losartan 50 mg and Nifedipine ER 90 mg for HTN.\par \par Last stress was on 10/28/20 and completed 4.5 minutes of Kenneth protocol.  There was no ECG evidence of ischemia.  Following treadmill stress, there was no echocardiographic evidence of ischemia.  CXR showed possible right hilar mass.  Chest CT showed 4.6 cm lung mass.  I advised patient to see Dr. Matt Richard.  Patient underwent biopsy which was unrevealing.  Patient was advised to undergo VATS.\par  \par \par

## 2022-09-01 ENCOUNTER — NON-APPOINTMENT (OUTPATIENT)
Age: 77
End: 2022-09-01

## 2022-09-01 ENCOUNTER — APPOINTMENT (OUTPATIENT)
Dept: CARDIOLOGY | Facility: CLINIC | Age: 77
End: 2022-09-01

## 2022-09-01 VITALS
HEART RATE: 86 BPM | DIASTOLIC BLOOD PRESSURE: 69 MMHG | WEIGHT: 98 LBS | TEMPERATURE: 98.1 F | OXYGEN SATURATION: 99 % | SYSTOLIC BLOOD PRESSURE: 169 MMHG | RESPIRATION RATE: 18 BRPM | BODY MASS INDEX: 17.36 KG/M2

## 2022-09-01 VITALS — SYSTOLIC BLOOD PRESSURE: 156 MMHG | DIASTOLIC BLOOD PRESSURE: 67 MMHG

## 2022-09-01 DIAGNOSIS — R14.0 ABDOMINAL DISTENSION (GASEOUS): ICD-10-CM

## 2022-09-01 DIAGNOSIS — R07.89 OTHER CHEST PAIN: ICD-10-CM

## 2022-09-01 PROCEDURE — 93000 ELECTROCARDIOGRAM COMPLETE: CPT

## 2022-09-01 PROCEDURE — 99214 OFFICE O/P EST MOD 30 MIN: CPT

## 2022-09-01 PROCEDURE — 93306 TTE W/DOPPLER COMPLETE: CPT

## 2022-09-01 RX ORDER — SIMETHICONE 80 MG/1
80 TABLET, CHEWABLE ORAL
Qty: 60 | Refills: 1 | Status: ACTIVE | COMMUNITY
Start: 2022-09-01 | End: 1900-01-01

## 2022-09-02 ENCOUNTER — NON-APPOINTMENT (OUTPATIENT)
Age: 77
End: 2022-09-02

## 2022-09-07 NOTE — ASSESSMENT
[FreeTextEntry1] : 77 year-old female with lung CA, pericardial effusion, HTN, DM with renal cyst and h/o proteinuria presents for followup. \par \par Patient was last seen on 5/20/22 for followup. Echo showed small pericardial effusion (0.8 cm posteriorly) without echocardiographic evidence of tamponade.\par \par She is on Losartan 50 mg and Nifedipine ER 90 mg for HTN.\par \par Last stress was on 10/28/20 and completed 4.5 minutes of Kenneth protocol. There was no ECG evidence of ischemia. Following treadmill stress, there was no echocardiographic evidence of ischemia. CXR showed possible right hilar mass. Chest CT showed 4.6 cm lung mass. I advised patient to see Dr. Matt Richard. Patient underwent biopsy which was unrevealing. Patient was advised to undergo VATS.\par \par 1) pericardial effusion,\par - ECG today with low voltage only in limb leads, occasional PVCs and nonspecific TWI in V2-V3\par - I advised repeat TTE to reassess pericardial effusion - repeat TTE today 9/1/22 shows moderate sized circumferential effusion with some respiratory variation in the MV inflow doppler but no clear evidence for RV diastolic collapse. \par - Case discussed with Dr. Moser and Dr. Matt Richard and patient -- plan to repeat TTE in 1 week to assess for growth, pt might need evaluation for pericardial window in the near future\par - We discussed close monitoring for symptoms of shortness of breath, dizziness, low blood pressures at home, and that she should report to ED if she develops these symptoms\par \par 2) abdominal fullness, without pain\par - she requested Rx for simethicone for gas \par - I ordered abdomen US to r/o ascites \par - pt has oncology follow-up in 2 weeks, we discussed she should follow closely with them\par \par 3) HTN, 169/69, on repeat 156/67, given age, cancer, pericardial effusion, will not uptitrate meds at this time\par - continue losartan 50mg \par - continue nifedipine ER 90mg\par \par 4) Follow-up for repeat TTE next week

## 2022-09-07 NOTE — PHYSICAL EXAM
[Normal S1, S2] : normal S1, S2 [No Rub] : no rub [No Gallop] : no gallop [Normal] : moves all extremities, no focal deficits, normal speech [de-identified] : 2/6 BONNIE

## 2022-09-07 NOTE — REASON FOR VISIT
[Symptom and Test Evaluation] : symptom and test evaluation [Other: ____] : [unfilled] [FreeTextEntry1] : 77 year-old female with lung CA, pericardial effusion, HTN, DM with renal cyst and h/o proteinuria presents for followup. \par \par Patient was last seen on 5/20/22 for followup. Echo showed small pericardial effusion (0.8 cm posteriorly) without echocardiographic evidence of tamponade.\par \par She is on Losartan 50 mg and Nifedipine ER 90 mg for HTN.\par \par Last stress was on 10/28/20 and completed 4.5 minutes of Kenneth protocol. There was no ECG evidence of ischemia. Following treadmill stress, there was no echocardiographic evidence of ischemia. CXR showed possible right hilar mass. Chest CT showed 4.6 cm lung mass. I advised patient to see Dr. Matt Richard. Patient underwent biopsy which was unrevealing. Patient was advised to undergo VATS.\par

## 2022-09-07 NOTE — HISTORY OF PRESENT ILLNESS
[FreeTextEntry1] : Pt states she has been doing well. For the past month, she has been having mild abdominal distension without pain. She spoke to her oncology doctors who thought it could be related to gas. She had CTs done (unclear if abdomen as well) which showed stability in her cancer without progression. She otherwise denies any chest pain, SOB, palpitations, lightheadedness, or LOC. \par \par 5/20/22 - Patient has been stable. Patient denies CP, SOB, palpitations, or lightheadedness. Patient denies h/o syncope. Patient has trace LE edema. Frequent ectopy noted on exam. 2/6 BONNIE noted. I advised patient to undergo an echocardiogram to followup on pericardial effusion. Echo showed small pericardial effusion (0.8 cm posteriorly) without echocardiographic evidence of tamponade.\par \par 4/22/22 - Patient had cardiac tamponade and underwent pericardiocentesis on 4/4 at [facility], cytology was negative for cancer. Patient was hospitalized for 5 days. Patient had no symptoms at the time.. Patient denies CP, SOB, palpitations, or lightheadedness. Patient reports faster HR than before. Patient has poor appetite and weight loss. I advised patient to see GI. I advised patient to undergo an echocardiogram to assess pericardial effusion. Echo showed small pericardial effusion (0.8 cm posteriorly) without echocardiographic evidence of tamponade.\par \par 5/3/21 - Patient has been stable. Patient denies CP, SOB, palpitations, or lightheadedness. Patient is to start Tagrisso and was told and there is a small chance of cardiotoxicity. Her BP was elevated. I advised patient to increase Losartan to 50 mg. FU 3 months. \par \par 3/3/21 - Patient did not go for surgery and went to [facility] [address]. She may go for medical therapy and was told that it may affect her heart. I advised patient that her LV function is normal and may proceed with treatment.\par \par 10/28/20 - Patient reports that for the past 2 weeks she feels SOB with exertion and at rest. Patient reports h/o bradycardia. Patient had CXR earlier this year. Patient denies CP. Patient denies palpitations. Patient denies h/o syncope. Patient reports hand numbness that went away after taking vitamin B12. On examination, patient has 2/6 BONNIE diffusely and trace edema. I advised patient to undergo an echocardiogram and a treadmill stress test.

## 2022-09-09 ENCOUNTER — APPOINTMENT (OUTPATIENT)
Dept: CARDIOLOGY | Facility: CLINIC | Age: 77
End: 2022-09-09

## 2022-09-22 PROBLEM — I10 HTN (HYPERTENSION): Status: ACTIVE | Noted: 2020-10-28

## 2022-09-22 PROBLEM — I31.3 PERICARDIAL EFFUSION: Status: ACTIVE | Noted: 2022-04-26

## 2022-09-22 PROBLEM — C34.90 LUNG CANCER: Status: ACTIVE | Noted: 2022-04-26

## 2022-09-23 ENCOUNTER — APPOINTMENT (OUTPATIENT)
Dept: CARDIOLOGY | Facility: CLINIC | Age: 77
End: 2022-09-23

## 2022-09-23 VITALS
WEIGHT: 112 LBS | DIASTOLIC BLOOD PRESSURE: 76 MMHG | RESPIRATION RATE: 18 BRPM | HEART RATE: 81 BPM | TEMPERATURE: 97.2 F | BODY MASS INDEX: 19.84 KG/M2 | OXYGEN SATURATION: 98 % | SYSTOLIC BLOOD PRESSURE: 122 MMHG

## 2022-09-23 DIAGNOSIS — I31.3 PERICARDIAL EFFUSION (NONINFLAMMATORY): ICD-10-CM

## 2022-09-23 DIAGNOSIS — R60.9 EDEMA, UNSPECIFIED: ICD-10-CM

## 2022-09-23 DIAGNOSIS — C34.90 MALIGNANT NEOPLASM OF UNSPECIFIED PART OF UNSPECIFIED BRONCHUS OR LUNG: ICD-10-CM

## 2022-09-23 DIAGNOSIS — I10 ESSENTIAL (PRIMARY) HYPERTENSION: ICD-10-CM

## 2022-09-23 PROCEDURE — 93306 TTE W/DOPPLER COMPLETE: CPT

## 2022-09-23 PROCEDURE — 99213 OFFICE O/P EST LOW 20 MIN: CPT

## 2022-09-23 RX ORDER — NIFEDIPINE 60 MG/1
60 TABLET, EXTENDED RELEASE ORAL
Qty: 30 | Refills: 5 | Status: ACTIVE | COMMUNITY
Start: 1900-01-01 | End: 1900-01-01

## 2022-09-23 RX ORDER — METOPROLOL SUCCINATE 25 MG/1
25 TABLET, EXTENDED RELEASE ORAL DAILY
Qty: 30 | Refills: 5 | Status: ACTIVE | COMMUNITY
Start: 2022-09-23 | End: 1900-01-01

## 2022-09-26 PROBLEM — R60.9 EDEMA: Status: ACTIVE | Noted: 2022-09-26

## 2022-10-07 NOTE — HISTORY OF PRESENT ILLNESS
[FreeTextEntry1] : 9/23/22-  Patient reports ascites, and is s/p paracentesis for 850 cc on 9/15 at hospital. Patient has been trying to eat more and has gained weight. Patient has 2+ edema.  I advised patient to undergo an echocardiogram. I advised patient to start on Metoprolol ER 25 mg for fast HR.  \par \par \par 9/1/22 with Dr. Rahmna - Pt states she has been doing well. For the past month, she has been having mild abdominal distension without pain. She spoke to her oncology doctors who thought it could be related to gas. She had CTs done (unclear if abdomen as well) which showed stability in her cancer without progression. She otherwise denies any chest pain, SOB, palpitations, lightheadedness, or LOC. \par \par 5/20/22 - Patient has been stable. Patient denies CP, SOB, palpitations, or lightheadedness. Patient denies h/o syncope. Patient has trace LE edema. Frequent ectopy noted on exam. 2/6 BONNIE noted. I advised patient to undergo an echocardiogram to followup on pericardial effusion. Echo showed small pericardial effusion (0.8 cm posteriorly) without echocardiographic evidence of tamponade.\par \par 4/22/22 - Patient had cardiac tamponade and underwent pericardiocentesis on 4/4 at [facility], cytology was negative for cancer. Patient was hospitalized for 5 days. Patient had no symptoms at the time.. Patient denies CP, SOB, palpitations, or lightheadedness. Patient reports faster HR than before. Patient has poor appetite and weight loss. I advised patient to see GI. I advised patient to undergo an echocardiogram to assess pericardial effusion. Echo showed small pericardial effusion (0.8 cm posteriorly) without echocardiographic evidence of tamponade.\par \par 5/3/21 - Patient has been stable. Patient denies CP, SOB, palpitations, or lightheadedness. Patient is to start Tagrisso and was told and there is a small chance of cardiotoxicity. Her BP was elevated. I advised patient to increase Losartan to 50 mg. FU 3 months. \par \par 3/3/21 - Patient did not go for surgery and went to [facility] on [address]. She may go for medical therapy and was told that it may affect her heart. I advised patient that her LV function is normal and may proceed with treatment.\par \par 10/28/20 - Patient reports that for the past 2 weeks she feels SOB with exertion and at rest. Patient reports h/o bradycardia. Patient had CXR earlier this year. Patient denies CP. Patient denies palpitations. Patient denies h/o syncope. Patient reports hand numbness that went away after taking vitamin B12. On examination, patient has 2/6 BONNIE diffusely and trace edema. I advised patient to undergo an echocardiogram and a treadmill stress test.

## 2022-10-07 NOTE — REASON FOR VISIT
[Other: ____] : [unfilled] [FreeTextEntry1] : 77 year-old female with lung CA, pericardial effusion, HTN, DM,  renal cyst,  proteinuria presents for followup. \par \par Patient was last seen on 9/1/22 with Dr. Rahman  for mild abdominal distension without pain.  Patient underwent an echocardiogram and it showed normal LV function with increased pericardial effusion without obvious tamponade.  Patient was advised to undergo abd sonogram to rule out ascites.  \par \par She is on Losartan 50 mg and Nifedipine ER 90 mg for HTN.\par \par Last stress was on 10/28/20 and completed 4.5 minutes of Kenneth protocol. There was no ECG evidence of ischemia. Following treadmill stress, there was no echocardiographic evidence of ischemia. CXR showed possible right hilar mass. Chest CT showed 4.6 cm lung mass. I advised patient to see Dr. Matt Richard. Patient underwent biopsy which was unrevealing. Patient was advised to undergo VATS.\par

## 2022-10-07 NOTE — PHYSICAL EXAM
[Normal S1, S2] : normal S1, S2 [No Rub] : no rub [No Gallop] : no gallop [Normal] : moves all extremities, no focal deficits, normal speech [de-identified] : 2/6 BONNIE

## 2022-10-28 ENCOUNTER — APPOINTMENT (OUTPATIENT)
Dept: CARDIOLOGY | Facility: CLINIC | Age: 77
End: 2022-10-28